# Patient Record
Sex: MALE | HISPANIC OR LATINO | Employment: PART TIME | URBAN - METROPOLITAN AREA
[De-identification: names, ages, dates, MRNs, and addresses within clinical notes are randomized per-mention and may not be internally consistent; named-entity substitution may affect disease eponyms.]

---
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
## 2024-02-20 ENCOUNTER — HOSPITAL ENCOUNTER (EMERGENCY)
Facility: CLINIC | Age: 19
Discharge: HOME | End: 2024-02-20
Attending: EMERGENCY MEDICINE | Admitting: EMERGENCY MEDICINE
Payer: COMMERCIAL

## 2024-02-20 VITALS
BODY MASS INDEX: 31.76 KG/M2 | DIASTOLIC BLOOD PRESSURE: 57 MMHG | RESPIRATION RATE: 16 BRPM | HEIGHT: 67 IN | HEART RATE: 108 BPM | WEIGHT: 202.38 LBS | TEMPERATURE: 98.2 F | OXYGEN SATURATION: 96 % | SYSTOLIC BLOOD PRESSURE: 132 MMHG

## 2024-02-20 DIAGNOSIS — J02.9 VIRAL PHARYNGITIS: ICD-10-CM

## 2024-02-20 LAB
ANION GAP SERPL CALCULATED.3IONS-SCNC: 8 MMOL/L (ref 7–15)
ATRIAL RATE - MUSE: 98 BPM
BASOPHILS # BLD AUTO: 0.1 10E3/UL (ref 0–0.2)
BASOPHILS NFR BLD AUTO: 1 %
BUN SERPL-MCNC: 13.8 MG/DL (ref 6–20)
CALCIUM SERPL-MCNC: 9.2 MG/DL (ref 8.6–10)
CHLORIDE SERPL-SCNC: 102 MMOL/L (ref 98–107)
CREAT SERPL-MCNC: 0.96 MG/DL (ref 0.67–1.17)
DEPRECATED HCO3 PLAS-SCNC: 26 MMOL/L (ref 22–29)
DIASTOLIC BLOOD PRESSURE - MUSE: NORMAL MMHG
EGFRCR SERPLBLD CKD-EPI 2021: >90 ML/MIN/1.73M2
EOSINOPHIL # BLD AUTO: 0 10E3/UL (ref 0–0.7)
EOSINOPHIL NFR BLD AUTO: 0 %
ERYTHROCYTE [DISTWIDTH] IN BLOOD BY AUTOMATED COUNT: 12.5 % (ref 10–15)
FLUAV RNA SPEC QL NAA+PROBE: NEGATIVE
FLUBV RNA RESP QL NAA+PROBE: NEGATIVE
GLUCOSE SERPL-MCNC: 106 MG/DL (ref 70–99)
GROUP A STREP BY PCR: NOT DETECTED
HCT VFR BLD AUTO: 44.9 % (ref 40–53)
HGB BLD-MCNC: 15.1 G/DL (ref 13.3–17.7)
HOLD SPECIMEN: NORMAL
HOLD SPECIMEN: NORMAL
IMM GRANULOCYTES # BLD: 0.1 10E3/UL
IMM GRANULOCYTES NFR BLD: 1 %
INTERPRETATION ECG - MUSE: NORMAL
LYMPHOCYTES # BLD AUTO: 1.7 10E3/UL (ref 0.8–5.3)
LYMPHOCYTES NFR BLD AUTO: 13 %
MCH RBC QN AUTO: 28.9 PG (ref 26.5–33)
MCHC RBC AUTO-ENTMCNC: 33.6 G/DL (ref 31.5–36.5)
MCV RBC AUTO: 86 FL (ref 78–100)
MONOCYTES # BLD AUTO: 1.3 10E3/UL (ref 0–1.3)
MONOCYTES NFR BLD AUTO: 10 %
NEUTROPHILS # BLD AUTO: 10.1 10E3/UL (ref 1.6–8.3)
NEUTROPHILS NFR BLD AUTO: 75 %
NRBC # BLD AUTO: 0 10E3/UL
NRBC BLD AUTO-RTO: 0 /100
P AXIS - MUSE: 64 DEGREES
PLATELET # BLD AUTO: 169 10E3/UL (ref 150–450)
POTASSIUM SERPL-SCNC: 3.8 MMOL/L (ref 3.4–5.3)
PR INTERVAL - MUSE: 150 MS
QRS DURATION - MUSE: 90 MS
QT - MUSE: 320 MS
QTC - MUSE: 408 MS
R AXIS - MUSE: 32 DEGREES
RBC # BLD AUTO: 5.22 10E6/UL (ref 4.4–5.9)
RSV RNA SPEC NAA+PROBE: NEGATIVE
SARS-COV-2 RNA RESP QL NAA+PROBE: NEGATIVE
SODIUM SERPL-SCNC: 136 MMOL/L (ref 135–145)
SYSTOLIC BLOOD PRESSURE - MUSE: NORMAL MMHG
T AXIS - MUSE: 48 DEGREES
TROPONIN T SERPL HS-MCNC: <6 NG/L
VENTRICULAR RATE- MUSE: 98 BPM
WBC # BLD AUTO: 13.2 10E3/UL (ref 4–11)

## 2024-02-20 PROCEDURE — 93005 ELECTROCARDIOGRAM TRACING: CPT

## 2024-02-20 PROCEDURE — 87651 STREP A DNA AMP PROBE: CPT | Performed by: EMERGENCY MEDICINE

## 2024-02-20 PROCEDURE — 87637 SARSCOV2&INF A&B&RSV AMP PRB: CPT | Performed by: EMERGENCY MEDICINE

## 2024-02-20 PROCEDURE — 80048 BASIC METABOLIC PNL TOTAL CA: CPT | Performed by: EMERGENCY MEDICINE

## 2024-02-20 PROCEDURE — 84484 ASSAY OF TROPONIN QUANT: CPT | Performed by: EMERGENCY MEDICINE

## 2024-02-20 PROCEDURE — 36415 COLL VENOUS BLD VENIPUNCTURE: CPT | Performed by: EMERGENCY MEDICINE

## 2024-02-20 PROCEDURE — 99284 EMERGENCY DEPT VISIT MOD MDM: CPT

## 2024-02-20 PROCEDURE — 85025 COMPLETE CBC W/AUTO DIFF WBC: CPT | Performed by: EMERGENCY MEDICINE

## 2024-02-20 RX ORDER — ALBUTEROL SULFATE 90 UG/1
2 AEROSOL, METERED RESPIRATORY (INHALATION) EVERY 6 HOURS PRN
Qty: 18 G | Refills: 0 | Status: SHIPPED | OUTPATIENT
Start: 2024-02-20

## 2024-02-20 ASSESSMENT — ACTIVITIES OF DAILY LIVING (ADL): ADLS_ACUITY_SCORE: 33

## 2024-02-20 NOTE — ED TRIAGE NOTES
Pt arrives with multiple complaints. Pt c/o midstenal chest pain, worse with deep breath, SOB, sore throat, body sweats, diarrhea and abdominal pain. Reports epistaxis this AM. Pt reports siblings are currently being treated for H.Pylori. Pt has HX asthma. Has not used inhaler prior to arrival. A&OX4.      Triage Assessment (Adult)       Row Name 02/20/24 0714          Triage Assessment    Airway WDL WDL        Respiratory WDL    Respiratory WDL X;rhythm/pattern     Rhythm/Pattern, Respiratory shortness of breath        Skin Circulation/Temperature WDL    Skin Circulation/Temperature WDL WDL        Cardiac WDL    Cardiac WDL X;chest pain        Chest Pain Assessment    Chest Pain Location midsternal        Peripheral/Neurovascular WDL    Peripheral Neurovascular WDL WDL        Cognitive/Neuro/Behavioral WDL    Cognitive/Neuro/Behavioral WDL WDL

## 2024-02-20 NOTE — ED PROVIDER NOTES
"  History     Chief Complaint:  Sore Throat     The history is provided by the patient.      Zack Mercado is a 18 year old male who presents to the ED with sore throat. Patient's symptoms started yesterday. Endorses cold sweats, sore throat, chest pain, rigors, headache, congestion, shortness of breath, right ear pain, diarrhea,  fatigue, weakness, and some abdominal discomfort. Denies coughing, rash, nausea, vomiting, and dysuria. Notes that his asthma is managed well. He took some Tylenol yesterday. He is concerned that both of his brothers have H.pylori. He denies any other symptoms.     Independent Historian:   None - Patient Only    Review of External Notes:  None    Medications:    Tylenol     Past Medical History:    Asthma  Scapular dyskinesis   High triglycerides   Food insecurity     Past Surgical History:    Appendectomy      Physical Exam   Patient Vitals for the past 24 hrs:   BP Temp Temp src Pulse Resp SpO2 Height Weight   02/20/24 0920 132/57 -- -- 108 16 96 % -- --   02/20/24 0714 125/81 98.2  F (36.8  C) Temporal 118 20 98 % 1.702 m (5' 7\") 91.8 kg (202 lb 6.1 oz)        Physical Exam  General: Well appearing, nontoxic.  Resting comfortably  Head:  Scalp, face, and head appear normal  Eyes:  Pupils are equal, round    Conjunctivae non-injected and sclerae white  ENT:    The external nose is normal    Pinnae are normal. Bilateral TMs clear without erythema, bulging, or effusion. Auditory canals normal.  Patient is moderately erythematous without exudates swelling lesions.  Mucous membranes moist.  Neck:  Normal range of motion    There is no rigidity noted    Trachea is in the midline  CV:  Regular rate and rhythm     Normal S1/S2, no S3/S4    No murmur or rub. Radial pulses 2+ bilaterally.  Resp:  Lungs are clear and equal bilaterally  There is no tachypnea    No increased work of breathing    No rales, wheezing, or rhonchi  GI:  Abdomen is soft, no rigidity or guarding    No " distension, or mass    No tenderness or rebound tenderness   MS:  Normal muscular tone    Symmetric motor strength    No lower extremity edema  Skin:  No rash or acute skin lesions noted  Neuro:  Awake and alert  Speech is normal and fluent  Moves all extremities spontaneously  Psych: Normal affect. Appropriate interactions.     Emergency Department Course   ECG  ECG taken at 0726, ECG read at 0759  Normal sinus rhythm    Rate 98 bpm. HI interval 150 ms. QRS duration 90 ms. QT/QTc 320/408 ms. P-R-T axes 64 32 48.     Imaging:  None    Laboratory:  Labs Ordered and Resulted from Time of ED Arrival to Time of ED Departure   BASIC METABOLIC PANEL - Abnormal       Result Value    Sodium 136      Potassium 3.8      Chloride 102      Carbon Dioxide (CO2) 26      Anion Gap 8      Urea Nitrogen 13.8      Creatinine 0.96      GFR Estimate >90      Calcium 9.2      Glucose 106 (*)    CBC WITH PLATELETS AND DIFFERENTIAL - Abnormal    WBC Count 13.2 (*)     RBC Count 5.22      Hemoglobin 15.1      Hematocrit 44.9      MCV 86      MCH 28.9      MCHC 33.6      RDW 12.5      Platelet Count 169      % Neutrophils 75      % Lymphocytes 13      % Monocytes 10      % Eosinophils 0      % Basophils 1      % Immature Granulocytes 1      NRBCs per 100 WBC 0      Absolute Neutrophils 10.1 (*)     Absolute Lymphocytes 1.7      Absolute Monocytes 1.3      Absolute Eosinophils 0.0      Absolute Basophils 0.1      Absolute Immature Granulocytes 0.1      Absolute NRBCs 0.0     TROPONIN T, HIGH SENSITIVITY - Normal    Troponin T, High Sensitivity <6     INFLUENZA A/B, RSV, & SARS-COV2 PCR - Normal    Influenza A PCR Negative      Influenza B PCR Negative      RSV PCR Negative      SARS CoV2 PCR Negative     GROUP A STREPTOCOCCUS PCR THROAT SWAB - Normal    Group A strep by PCR Not Detected       Procedures   None    Emergency Department Course & Assessments:    Interventions:  Medications - No data to display     Assessments, Independent  Interpretation, Consults/Discussion of Management/Tests:  ED Course as of 02/20/24 1259   Tue Feb 20, 2024   0830 I obtained the history and examined the patient as noted above.       Social Determinants of Health affecting care:  None    Disposition:  The patient was discharged to home.     Impression & Plan      Medical Decision Making:  Zack Mercado is a 18 year old male who presents to the ED for evaluation of symptoms consistent with viral syndrome.  On my evaluation he is well-appearing, hemodynamically stable and afebrile.  Abdominal exam is benign without tenderness whatsoever.  No evidence of otitis media or otitis externa.  Lungs are clear and equal to auscultation.  No clinical findings to suggest pneumonia. COVID/Influenza/RSV testing negative. Strep PCR testing is negative.  Findings are most consistent with viral pharyngitis.  Laboratory studies and EKG are unremarkable.  Signs and symptoms are not consistent with primary cardiac etiology.  No evidence of pericarditis.  I recommended supportive care at home with Tylenol, ibuprofen, continued use of his albuterol Hailer as needed for asthma.  No significant wheezing or bronchospasm on exam.  No significant asthma exacerbation.  I recommended close follow-up with PCP if symptoms do not improve.  Patient agreeable with the plan of care and he was discharged in stable condition.    Diagnosis:    ICD-10-CM    1. Viral pharyngitis  J02.9            Discharge Medications:  Discharge Medication List as of 2/20/2024  9:17 AM        START taking these medications    Details   acetaminophen 500 MG CAPS Take 2 capsules by mouth every 8 hours as needed (For aches, pain, fever) Do not take more than 4000mg in 24 hours., Disp-60 capsule, R-0, E-Prescribe      albuterol (PROAIR HFA/PROVENTIL HFA/VENTOLIN HFA) 108 (90 Base) MCG/ACT inhaler Inhale 2 puffs into the lungs every 6 hours as needed for shortness of breath, wheezing or cough, Disp-18 g, R-0,  E-PrescribePharmacy may dispense brand covered by insurance (Proair, or proventil or ventolin or generic albuterol inhaler)            Scribe Disclosure:  I, French Young, am serving as a scribe at 9:09 AM on 2/20/2024 to document services personally performed by Russel Nascimento MD based on my observations and the provider's statements to me.    2/20/2024   Russel Nascimento MD Daro, Ryan Clay, MD  02/20/24 1308

## 2024-06-10 ENCOUNTER — HOSPITAL ENCOUNTER (EMERGENCY)
Facility: CLINIC | Age: 19
Discharge: HOME | End: 2024-06-10
Attending: EMERGENCY MEDICINE | Admitting: EMERGENCY MEDICINE
Payer: COMMERCIAL

## 2024-06-10 VITALS
RESPIRATION RATE: 18 BRPM | SYSTOLIC BLOOD PRESSURE: 137 MMHG | OXYGEN SATURATION: 98 % | HEIGHT: 67 IN | DIASTOLIC BLOOD PRESSURE: 89 MMHG | BODY MASS INDEX: 33.01 KG/M2 | HEART RATE: 96 BPM | TEMPERATURE: 98.9 F | WEIGHT: 210.32 LBS

## 2024-06-10 DIAGNOSIS — K62.5 BRBPR (BRIGHT RED BLOOD PER RECTUM): ICD-10-CM

## 2024-06-10 LAB
ABO/RH(D): NORMAL
ALBUMIN SERPL BCG-MCNC: 4.7 G/DL (ref 3.5–5.2)
ALP SERPL-CCNC: 102 U/L (ref 65–260)
ALT SERPL W P-5'-P-CCNC: 39 U/L (ref 0–50)
ANION GAP SERPL CALCULATED.3IONS-SCNC: 13 MMOL/L (ref 7–15)
ANTIBODY SCREEN: NEGATIVE
AST SERPL W P-5'-P-CCNC: 28 U/L (ref 0–35)
BASOPHILS # BLD AUTO: 0.1 10E3/UL (ref 0–0.2)
BASOPHILS NFR BLD AUTO: 1 %
BILIRUB SERPL-MCNC: 0.2 MG/DL
BUN SERPL-MCNC: 14.5 MG/DL (ref 6–20)
CALCIUM SERPL-MCNC: 9.9 MG/DL (ref 8.6–10)
CHLORIDE SERPL-SCNC: 101 MMOL/L (ref 98–107)
CREAT SERPL-MCNC: 0.89 MG/DL (ref 0.67–1.17)
DEPRECATED HCO3 PLAS-SCNC: 26 MMOL/L (ref 22–29)
EGFRCR SERPLBLD CKD-EPI 2021: >90 ML/MIN/1.73M2
EOSINOPHIL # BLD AUTO: 0.1 10E3/UL (ref 0–0.7)
EOSINOPHIL NFR BLD AUTO: 1 %
ERYTHROCYTE [DISTWIDTH] IN BLOOD BY AUTOMATED COUNT: 12.6 % (ref 10–15)
GLUCOSE SERPL-MCNC: 99 MG/DL (ref 70–99)
HCT VFR BLD AUTO: 46.5 % (ref 40–53)
HGB BLD-MCNC: 15 G/DL (ref 13.3–17.7)
HOLD SPECIMEN: NORMAL
HOLD SPECIMEN: NORMAL
IMM GRANULOCYTES # BLD: 0 10E3/UL
IMM GRANULOCYTES NFR BLD: 0 %
LYMPHOCYTES # BLD AUTO: 2.2 10E3/UL (ref 0.8–5.3)
LYMPHOCYTES NFR BLD AUTO: 32 %
MCH RBC QN AUTO: 28.5 PG (ref 26.5–33)
MCHC RBC AUTO-ENTMCNC: 32.3 G/DL (ref 31.5–36.5)
MCV RBC AUTO: 88 FL (ref 78–100)
MONOCYTES # BLD AUTO: 0.7 10E3/UL (ref 0–1.3)
MONOCYTES NFR BLD AUTO: 10 %
NEUTROPHILS # BLD AUTO: 3.8 10E3/UL (ref 1.6–8.3)
NEUTROPHILS NFR BLD AUTO: 56 %
NRBC # BLD AUTO: 0 10E3/UL
NRBC BLD AUTO-RTO: 0 /100
PLATELET # BLD AUTO: 205 10E3/UL (ref 150–450)
POTASSIUM SERPL-SCNC: 4.3 MMOL/L (ref 3.4–5.3)
PROT SERPL-MCNC: 7.9 G/DL (ref 6.4–8.3)
RBC # BLD AUTO: 5.26 10E6/UL (ref 4.4–5.9)
SODIUM SERPL-SCNC: 140 MMOL/L (ref 135–145)
SPECIMEN EXPIRATION DATE: NORMAL
WBC # BLD AUTO: 6.8 10E3/UL (ref 4–11)

## 2024-06-10 PROCEDURE — 86900 BLOOD TYPING SEROLOGIC ABO: CPT | Performed by: EMERGENCY MEDICINE

## 2024-06-10 PROCEDURE — 36415 COLL VENOUS BLD VENIPUNCTURE: CPT | Performed by: EMERGENCY MEDICINE

## 2024-06-10 PROCEDURE — 85025 COMPLETE CBC W/AUTO DIFF WBC: CPT | Performed by: EMERGENCY MEDICINE

## 2024-06-10 PROCEDURE — 80053 COMPREHEN METABOLIC PANEL: CPT | Performed by: EMERGENCY MEDICINE

## 2024-06-10 PROCEDURE — 99283 EMERGENCY DEPT VISIT LOW MDM: CPT

## 2024-06-10 ASSESSMENT — COLUMBIA-SUICIDE SEVERITY RATING SCALE - C-SSRS
6. HAVE YOU EVER DONE ANYTHING, STARTED TO DO ANYTHING, OR PREPARED TO DO ANYTHING TO END YOUR LIFE?: NO
1. IN THE PAST MONTH, HAVE YOU WISHED YOU WERE DEAD OR WISHED YOU COULD GO TO SLEEP AND NOT WAKE UP?: NO
2. HAVE YOU ACTUALLY HAD ANY THOUGHTS OF KILLING YOURSELF IN THE PAST MONTH?: NO

## 2024-06-10 ASSESSMENT — ACTIVITIES OF DAILY LIVING (ADL)
ADLS_ACUITY_SCORE: 33
ADLS_ACUITY_SCORE: 33

## 2024-06-10 NOTE — ED TRIAGE NOTES
Patient states he has been having blood in his stool since yesterday  patient reports the blood is bright red and he denies pain.      Triage Assessment (Adult)       Row Name 06/10/24 6621          Triage Assessment    Airway WDL WDL        Respiratory WDL    Respiratory WDL WDL        Skin Circulation/Temperature WDL    Skin Circulation/Temperature WDL WDL        Cardiac WDL    Cardiac WDL WDL        Peripheral/Neurovascular WDL    Peripheral Neurovascular WDL WDL        Cognitive/Neuro/Behavioral WDL    Cognitive/Neuro/Behavioral WDL WDL

## 2024-06-11 NOTE — DISCHARGE INSTRUCTIONS
It was a pleasure taking care of you today. I hope you feel much better soon.  Your workup was reassuring against serious anemia (blood loss). A colonoscopy has been ordered for further evaluation.  Please follow-up with your primary care doctor in 3-5 days.  Return immediately for pain, fever, worse bleeding, or any other concerns.

## 2024-06-11 NOTE — ED PROVIDER NOTES
"  Emergency Department Note      History of Present Illness     Chief Complaint  Rectal Bleeding    HPI  Zack Mercado is a 18 year old male who presents to the ED for rectal bleeding after noticing blood after a bowel movement yesterday. He reports a substantial amount of blood when wiping and in the toilet bowl. He denies any pain or abdominal pain. He denies feeling anything when wiping. Reports some diarrhea since 2 days ago. Reports history of similar symptoms one year ago and was given stool softeners and laxatives. He notes having diarrhea usually once or twice a week. Denies a family history of ulcerative colitis or Crohn's disease.     Independent Historian  None    Review of External Notes  Reviewed 3/11/2024 office visit  Past Medical History   Medical History and Problem List  Asthma    Medications  The patient is currently on no regular medications.    Surgical History   Appendectomy    Physical Exam   Patient Vitals for the past 24 hrs:   BP Temp Temp src Pulse Resp SpO2 Height Weight   06/10/24 1844 137/89 98.9  F (37.2  C) Oral 96 18 98 % 1.702 m (5' 7\") 95.4 kg (210 lb 5.1 oz)     Physical Exam  Constitutional: Alert, attentive  HENT:    Nose: Nose normal.   Eyes: EOM are normal.   CV: regular rate and rhythm; no murmurs, rubs or gallups  Chest: Effort normal and breath sounds normal.   GI:  There is no tenderness. No distension. Normal bowel sounds  MSK: Normal range of motion.   Neurological: Alert, attentive  Skin: Skin is warm and dry.    Diagnostics   Lab Results   Labs Ordered and Resulted from Time of ED Arrival to Time of ED Departure   COMPREHENSIVE METABOLIC PANEL - Normal       Result Value    Sodium 140      Potassium 4.3      Carbon Dioxide (CO2) 26      Anion Gap 13      Urea Nitrogen 14.5      Creatinine 0.89      GFR Estimate >90      Calcium 9.9      Chloride 101      Glucose 99      Alkaline Phosphatase 102      AST 28      ALT 39      Protein Total 7.9      Albumin 4.7 "      Bilirubin Total 0.2     CBC WITH PLATELETS AND DIFFERENTIAL    WBC Count 6.8      RBC Count 5.26      Hemoglobin 15.0      Hematocrit 46.5      MCV 88      MCH 28.5      MCHC 32.3      RDW 12.6      Platelet Count 205      % Neutrophils 56      % Lymphocytes 32      % Monocytes 10      % Eosinophils 1      % Basophils 1      % Immature Granulocytes 0      NRBCs per 100 WBC 0      Absolute Neutrophils 3.8      Absolute Lymphocytes 2.2      Absolute Monocytes 0.7      Absolute Eosinophils 0.1      Absolute Basophils 0.1      Absolute Immature Granulocytes 0.0      Absolute NRBCs 0.0     TYPE AND SCREEN, ADULT    ABO/RH(D) O POS      Antibody Screen Negative      SPECIMEN EXPIRATION DATE [id number]     ABO/RH TYPE AND SCREEN     Independent Interpretation  None  ED Course      ED Course  ED Course as of 06/11/24 0134   Mon Bill 10, 2024   2219 I obtained history and examined the patient as noted above. I explained findings to the patient and we discussed plan for discharge. The patient is comfortable with this plan.      Medical Decision Making / Diagnosis   CMS Diagnoses: None    MIPS     None    [facility]  Zack Mercado is a 18 year old male who presents for evaluation of painless BRBPR.  Patient denies any signs or symptoms of external hemorrhoids, and absent pain or other features, sense of exam is not indicated at this time.  He is hemodynamically stable and has normal hemoglobin.  No evidence of life-threatening bleeding at this time.  Suspect possible internal hemorrhoid versus other benign cause of symptoms.  However, given intermittent diarrhea, UC/Crohn's are also considered.  Plan primary care follow-up in 3 to 5 days for recheck and referral for colonoscopy.  Return precautions for fever, pain, or any other concerns.    Disposition  The patient was discharged.     ICD-10 Codes:    ICD-10-CM    1. BRBPR (bright red blood per rectum)  K62.5 Adult GI  Referral - Procedure Only              Scribe Disclosure:  I, Viktoria Guru, am serving as a scribe at 10:26 PM on 6/10/2024 to document services personally performed by Shahzad Orr MD based on my observations and the provider's statements to me.        Shahzad Orr MD  06/11/24 0135

## 2024-06-18 ENCOUNTER — HOSPITAL ENCOUNTER (OUTPATIENT)
Facility: CLINIC | Age: 19
End: 2024-06-18
Attending: COLON & RECTAL SURGERY | Admitting: COLON & RECTAL SURGERY
Payer: COMMERCIAL

## 2024-06-18 ENCOUNTER — TELEPHONE (OUTPATIENT)
Dept: GASTROENTEROLOGY | Facility: CLINIC | Age: 19
End: 2024-06-18
Payer: COMMERCIAL

## 2024-06-18 NOTE — TELEPHONE ENCOUNTER
"Endoscopy Scheduling Screen    Have you had a positive Covid test in the last 14 days?  No    What is your communication preference for Instructions and/or Bowel Prep?   Mail/USPS    What insurance is in the chart?  Other:  [facility]    Ordering/Referring Provider:   JENNIFER WILLAMS        (If ordering provider performs procedure, schedule with ordering provider unless otherwise instructed. )    BMI: Estimated body mass index is 32.94 kg/m  as calculated from the following:    Height as of 6/10/24: 1.702 m (5' 7\").    Weight as of 6/10/24: 95.4 kg (210 lb 5.1 oz).     Sedation Ordered  moderate sedation.   If patient BMI > 50 do not schedule in [facility].    If patient BMI > 45 do not schedule at [facility].    Are you taking methadone or Suboxone?  No    Have you had difficulties, pain, or discomfort during past endoscopy procedures?  No    Are you taking any prescription medications for pain 3 or more times per week?   NO, No RN review required.    Do you have a history of malignant hyperthermia?  No    (Females) Are you currently pregnant?   No     Have you been diagnosed or told you have pulmonary hypertension?   No    Do you have an LVAD?  No    Have you been told you have moderate to severe sleep apnea?  No    Have you been told you have COPD, asthma, or any other lung disease?  Yes     What breathing problems do you have?  Asthma     Do you use home oxygen?  No    Have your breathing problems required an ED visit or hospitalization in the last year?  No    Do you have any heart conditions?  No     Have you ever had or are you waiting for an organ transplant?  No. Continue scheduling, no site restrictions.    Have you had a stroke or transient ischemic attack (TIA aka \"mini stroke\" in the last 6 months?   No    Have you been diagnosed with or been told you have cirrhosis of the liver?   No    Are you currently on dialysis?   No    Do you need assistance transferring?   No    BMI: Estimated body mass index is 32.94 kg/m  " "as calculated from the following:    Height as of 6/10/24: 1.702 m (5' 7\").    Weight as of 6/10/24: 95.4 kg (210 lb 5.1 oz).     Is patients BMI > 40 and scheduling location [facility]?  No    Do you take an injectable medication for weight loss or diabetes (excluding insulin)?  No    Do you take the medication Naltrexone?  No    Do you take blood thinners?  No       Prep   Are you currently on dialysis or do you have chronic kidney disease?  No    Do you have a diagnosis of diabetes?  No    Do you have a diagnosis of cystic fibrosis (CF)?  No    On a regular basis do you go 3 -5 days between bowel movements?  No    BMI > 40?  No    Preferred Pharmacy:        [id number] - [place], [place] - [address]  [address]  [facility] [address]  Phone: [phone] Fax: [phone]      Final Scheduling Details     Procedure scheduled  Colonoscopy    Surgeon:  Sonya     Date of procedure:  7/19     Pre-OP / PAC:   No - Not required for this site.    Location  [facility] - Patient preference.    Sedation   Moderate Sedation - Per order.      Patient Reminders:   You will receive a call from a Nurse to review instructions and health history.  This assessment must be completed prior to your procedure.  Failure to complete the Nurse assessment may result in the procedure being cancelled.      On the day of your procedure, please designate an adult(s) who can drive you home stay with you for the next 24 hours. The medicines used in the exam will make you sleepy. You will not be able to drive.      You cannot take public transportation, ride share services, or non-medical taxi service without a responsible caregiver.  Medical transport services are allowed with the requirement that a responsible caregiver will receive you at your destination.  We require that drivers and caregivers are confirmed prior to your procedure.  "

## 2024-06-18 NOTE — LETTER
6/18/2024      Zack Mercado  [address]  [facility] [address]      Standard Miralax Bowel Prep   Prep instructions for your colonoscopy   For prep questions, please call: [facility] - [address] [facility], [place] [address] - [phone] option 2    Please read these instructions carefully at least 7 days prior to your colonoscopy procedure. Be sure to follow all directions completely. The inside of your colon must be clean to allow for a complete examination for the presence of any growths, polyps, and/or abnormalities, as well as their biopsy or removal. A number of tips are included in order to make this part of the procedure as comfortable as possible.    Getting ready   Purchase the following items over-the-counter/off the shelf at the drug store:    Four (4) - Dulcolax laxative (Bisacodyl) 5mg tablets (Do not use Dulcolax stool softener)   8.3 ounce bottle of Miralax powder (ClearLAX, SmoothLAX, PowderLAX)  64 ounces of Gatorade or similar sports drink. Not red or purple. (Pedialyte, Propel, Gatorade G2/Zero, Powerade, Powerade Zero)   10 ounce bottle of clear Magnesium Citrate    A nurse will call you to go over your appointment details and prep instructions. Not completing the nurse call could result with your appointment being cancelled.    You must arrange for an adult to drive you home after your exam. Your colonoscopy cannot be done unless you have a ride. If you need to use public transportation, someone must ride with you and stay with you for up to 24 hours.       7 days before procedure     Consult with your prescribing provider about stopping any:     Diabetic/Weight Loss Injectable Medication GLP-1 agonist (such as Exenatide (Byetta, Bydureon),  Mounjaro (Tirzepatide). Ozempic (Semaglutide). Semaglutide. Symlin (Pamlintide), Tanzeum (Albiglutide). Tirzepatide-Weight Management (Zepbound), Trulicity (Dulaglutide), Victoza (Saxenda, Liraglutide), Wegovy (Semaglutide)  please follow below guidelines for holding:    For weekly injection HOLD 7 days before procedure.  For once or twice a day injection HOLD the day before procedure and day of procedure.  For oral, daily dosing (Rybelsus) HOLD 7 days before procedure.    Blood thinning and/or anti platelet medications: such as Coumadin, Plavix, Xarelto, Eliquis, Lovenox or others, these medications may need to be stopped temporarily before your procedure.     If you take insulin for diabetes, ask your prescribing provider for instructions on how to take this medication while preparing for a colonoscopy.      Stop taking iron (ferrous sulfate), multivitamins that contain iron, and/or fiber supplements (Metamucil, Benefiber, Psyllium husk powder, Fibercon, etc.).      Stop eating whole kernel corn, popcorn, nuts, and foods that contain seeds. These can stay in the colon for many days, and they can clog up the colonoscope.       3 days before procedure     Begin a low-fiber diet (see examples below). No Olestra (a fat substitute).    Consume no more than 10-15 grams of fiber each day.     It is important to stay hydrated. Drink at least eight 8-ounce glasses of water a day.      LOW FIBER DIET   You can have:   Do not have:    Starches: White bread, rolls, biscuits, croissants, Beverly toast, white flour tortillas, waffles, pancakes, Hebrew toast; white rice, noodles, pasta, macaroni; cooked and peeled potatoes; plain crackers, saltines; cooked farina or cream of rice; puffed rice, corn flakes, Rice Krispies, Special K      Vegetables: tender cooked and canned, vegetable broths     Fruits and fruit juices: Strained fruit juice, canned fruit without seeds or skin (not pineapple), applesauce, pear sauce, ripe bananas, melons (not watermelon)     Milk products: Milk (plain or flavored), cheese, cottage cheese, yogurt (no berries), custard, ice cream       Proteins: Tender, well-cooked ground beef, lamb, veal, ham, pork, chicken, turkey, fish  or organ meat, Tofu, eggs, creamy peanut butter      Fats and condiments:  Margarine, butter, oils, mayonnaise, sour cream, salad dressing, plain gravy; spices, cooked herbs; sugar, clear jelly, honey, syrup      Snacks, sweets and drinks: Pretzels, hard candy; plain cakes and cookies (no nuts or seeds); gelatin, plain pudding, sherbet, Popsicles; coffee, tea, carbonated ( fizzy ) drinks  Starches: Breads or rolls that contain nuts, seeds or fruit; whole wheat or whole grain breads that contain more than 2 grams of fiber per serving; cornbread; corn or whole wheat tortillas; potatoes with skin; brown rice, wild rice, quinoa, kasha (buckwheat), and oatmeal      Vegetables: Any raw or steamed vegetables; vegetables with seeds; corn in any form      Fruits and fruit juices: Prunes, prune juice, raisins and other dried fruits, berries and other fruits with seeds, canned pineapple juices with pulp such as orange, grapefruit, pineapple or tomato juice     Milk products: Any yogurt with nuts, seeds or berries      Proteins: Tough, fibrous meats with gristle; cooked dried beans, peas or lentils; crunchy peanut butter     Fats and condiments: Pickles, olives, relish, horseradish; jam, marmalade, preserves      Snacks, sweets and drinks: Popcorn, nuts, seeds, granola, coconut, candies made with nuts or seeds; all desserts that contain nuts, seeds, raisins and other dried fruits, coconut, whole grains or bran.       1 day before procedure       Start a clear liquid diet (see examples below). Do not eat any solid food.      Drink at least eight to ten 8-ounce glasses of water throughout the day. ? ? ? ? ? ? ? ?      CLEAR LIQUID DIET:  You can have: Do not have:    Water, tea, coffee (no milk or cream)   Soda pop, Gatorade (not red or purple)   Coconut water   Jell-O, Popsicles (no milk or fruit pieces - not red or purple)   Fat-free soup broth or bouillon   Plain hard candy, such as clear life savers (not red or purple)    Clear juices and fruit-flavored drinks, such as apple juice, white grape juice, Hi-C, and Nile-Aid (not red or purple)  Milk or milk products such as ice cream, malts or shakes, or coffee creamer   Red or purple drinks of any kind such as cranberry juice, grape juice or Nile-Aid. Avoid red or purple Jell-O, Popsicles, sorbet, sherbet and candy   Juices with pulp such as orange, grapefruit, pineapple or tomato juice   Cream soups of any kind   Alcohol and beer   Protein drinks or protein powder     Step 1     At 4 PM, take 2 Dulcolax (Bisacodyl) tablets.   At 5 PM, mix the entire bottle of Miralax with 64 ounces of Gatorade in a pitcher and stir to dissolve the powder. Start drinking one 8-ounce glass of the Miralax and Gatorade mixture every 15 minutes until the pitcher is HALF empty (about 4 glasses).  Drink each glass quickly. Store the rest in the refrigerator.   Continue to drink clear liquids.    Step 2     At 10 PM, take 2 Dulcolax (Bisacodyl) tablets  At 10 PM start drinking the remainder of the Miralax and Gatorade mixture. Drink one 8-ounce glass of Miralax and Gatorade mixture every 15 minutes until the pitcher is empty (about 4 glasses). Drink each glass quickly.     Step 3     If you arrive for your procedure BEFORE 11 AM:  6 hours prior to your scheduled arrival to the endoscopy unit, drink 10 ounces of clear Magnesium Citrate.    If you arrive for your procedure AFTER 11 AM:  At 6 AM on the day of the exam drink 10 ounces of clear Magnesium Citrate.       Reminders While Drinking Laxatives:     After you start drinking the solution, stay near a toilet. You may have watery stools (diarrhea), mild cramping, bloating, and nausea. You may want to use Vaseline on the skin around your anus after each bowel movement or use wet wipes to prevent irritation. Bowel movements will be liquid and dark in color at first and then should turn clear yellow in color.      Some find it easier to drink the Miralax and  Gatorade mixture when it is chilled. Do not add ice as this will dilute the laxative. Drinking from a straw can be helpful to drink the liquid faster.     If you have nausea or vomiting during drinking the solution, rinse your mouth with water and take a 15-30 minute break and then continue drinking solution.       Day of procedure     2 hours before your arrival time stop drinking all liquids, including water.   Do not smoke or swallow anything, including water or gum for at least 2 hours before your arrival time. This is a safety issue. Your procedure could be cancelled if you do not follow directions.  No chewing tobacco 6 hours prior to procedure arrival time.     You may take your necessary morning medications with sips of water (4 ounces).   Do not take diabetes medicine by mouth until after your exam.  If you have asthma, bring your inhalers.  Please perform your nebulizer treatments and airway clearance therapy in the morning prior to the procedure (if applicable).    Arrive with a responsible adult who can drive you home and stay with you for up to 24 hours. The medications used during the procedure will make you sleepy, so you won't be able to drive yourself home.   You cannot use public transportation, ride-share services, or non-medical taxi services without a responsible caregiver. Medical transport services are okay, but a caregiver must be there to receive you at your destination.  Please check in with your  when you arrive. Drivers should stay on campus.    Expect to be at the procedure center for about 1.5-2.5 hours.    Do not wear jewelry (i.e. earrings, rings, necklaces, watches, etc.). Leave your purse, billfold, credit cards, and other valuables at home.      Bring insurance card and ID.       Answers to Commonly Asked Questions     How soon can I eat after the procedure?  You may resume your normal diet when you feel ready, unless advised otherwise by the doctor performing your procedure.  We recommend starting with a light meal.   Do not drink alcohol for 24 hours after your procedure.  You may resume normal activities (work, exercise, etc.) after 24 hours.    How might I feel after the procedure?  It is normal to feel bloated and gassy after your procedure. Walking will help move the air through your colon. You can take non-aspirin pain relievers that contain acetaminophen (Tylenol).  If you are having sedation, we require a responsible adult to take you home for your safety. The sedation medicines used to relax you during the procedure can impair your judgement and reaction time, and make you forgetful and possibly a little unsteady.  Do not drive, make any important decisions, or sign any legal documents for 24 hours after your procedure.    When will I get my test results?  You should have your procedure results and any lab results (if applicable) by letter, optionsXpresst message, or phone call within 2 weeks. If you have any questions, please call the doctor that referred you for the procedure.    How do I know if my colon is cleaned out?   After completing the bowel prep, your bowel movements should be all liquid and yellow. Your bowel movements will look similar to urine in the toilet. If there are pieces of stool (poop) in the toilet, or if you can't see to the bottom of the toilet, please call our office for advice. Call [phone] and ask to speak with a nurse.    Why is the Miralax bowel prep taken in several steps?   The stool is flushed out by a large wave of fluid going through the colon. Just sipping a large volume of the solution will not achieve the desired result. Studies have shown that two smaller waves (or more in some cases) are better than one large one.      Why do I need to drink the magnesium citrate so close to the procedure arrival time?   The intestine continues to produce mucus and waste. Longer intervals between the prep and the exam can lead to less than desired results.  However, the stomach must be empty at the time of the exam in order to allow safe sedation. Therefore, there should be nothing by mouth 2 hours before the exam is started.    What if I need to cancel or reschedule my procedure?  Contact our endoscopy scheduling team at [phone], option 2. Monday through Friday, 7:00am-5:00pm.

## 2024-06-18 NOTE — LETTER
June 21, 2024      Zack Mercado  [address]  [facility] [address]              Standard Miralax Bowel Prep   Prep instructions for your colonoscopy   For prep questions, please call: [facility]; [phone] option 4    Please read these instructions carefully at least 7 days prior to your colonoscopy procedure. Be sure to follow all directions completely. The inside of your colon must be clean to allow for a complete examination for the presence of any growths, polyps, and/or abnormalities, as well as their biopsy or removal. A number of tips are included in order to make this part of the procedure as comfortable as possible.    Getting ready   Purchase the following items over-the-counter/off the shelf at the drug store:    Four (4) - Dulcolax laxative (Bisacodyl) 5mg tablets (Do not use Dulcolax stool softener)   8.3 ounce bottle of Miralax powder (ClearLAX, SmoothLAX, PowderLAX)  64 ounces of Gatorade or similar sports drink. Not red or purple. (Pedialyte, Propel, Gatorade G2/Zero, Powerade, Powerade Zero)   10 ounce bottle of clear Magnesium Citrate    A nurse will call you to go over your appointment details and prep instructions. Not completing the nurse call could result with your appointment being cancelled.    You must arrange for an adult to drive you home after your exam. Your colonoscopy cannot be done unless you have a ride. If you need to use public transportation, someone must ride with you and stay with you for up to 24 hours.       7 days before procedure     Consult with your prescribing provider about stopping any:     Diabetic/Weight Loss Injectable Medication GLP-1 agonist (such as Exenatide (Byetta, Bydureon),  Mounjaro (Tirzepatide). Ozempic (Semaglutide). Semaglutide. Symlin (Pamlintide), Tanzeum (Albiglutide). Tirzepatide-Weight Management (Zepbound), Trulicity (Dulaglutide), Victoza (Saxenda, Liraglutide), Wegovy (Semaglutide) please follow below guidelines for  holding:    For weekly injection HOLD 7 days before procedure.  For once or twice a day injection HOLD the day before procedure and day of procedure.  For oral, daily dosing (Rybelsus) HOLD 7 days before procedure.    Blood thinning and/or anti platelet medications: such as Coumadin, Plavix, Xarelto, Eliquis, Lovenox or others, these medications may need to be stopped temporarily before your procedure.     If you take insulin for diabetes, ask your prescribing provider for instructions on how to take this medication while preparing for a colonoscopy.     NSAIDs medications such as Sulindac, Celebrex, Mobic, Relafen or others may need to be stopped before the procedure. This will be discussed during nurse review call, or you can reach out to your prescribing provider.      Stop taking iron (ferrous sulfate), multivitamins that contain iron, and/or fiber supplements (Metamucil, Benefiber, Psyllium husk powder, Fibercon, etc.).      Stop eating whole kernel corn, popcorn, nuts, and foods that contain seeds. These can stay in the colon for many days, and they can clog up the colonoscope.       3 days before procedure     Begin a low-fiber diet (see examples below). No Olestra (a fat substitute).    Consume no more than 10-15 grams of fiber each day.     It is important to stay hydrated. Drink at least eight 8-ounce glasses of water a day.      LOW FIBER DIET   You can have:   Do not have:    Starches: White bread, rolls, biscuits, croissants, Kassidy toast, white flour tortillas, waffles, pancakes, Telugu toast; white rice, noodles, pasta, macaroni; cooked and peeled potatoes; plain crackers, saltines; cooked farina or cream of rice; puffed rice, corn flakes, Rice Krispies, Special K      Vegetables: tender cooked and canned, vegetable broths     Fruits and fruit juices: Strained fruit juice, canned fruit without seeds or skin (not pineapple), applesauce, pear sauce, ripe bananas, melons (not watermelon)     Milk  products: Milk (plain or flavored), cheese, cottage cheese, yogurt (no berries), custard, ice cream       Proteins: Tender, well-cooked ground beef, lamb, veal, ham, pork, chicken, turkey, fish or organ meat, Tofu, eggs, creamy peanut butter      Fats and condiments:  Margarine, butter, oils, mayonnaise, sour cream, salad dressing, plain gravy; spices, cooked herbs; sugar, clear jelly, honey, syrup      Snacks, sweets and drinks: Pretzels, hard candy; plain cakes and cookies (no nuts or seeds); gelatin, plain pudding, sherbet, Popsicles; coffee, tea, carbonated ( fizzy ) drinks  Starches: Breads or rolls that contain nuts, seeds or fruit; whole wheat or whole grain breads that contain more than 2 grams of fiber per serving; cornbread; corn or whole wheat tortillas; potatoes with skin; brown rice, wild rice, quinoa, kasha (buckwheat), and oatmeal      Vegetables: Any raw or steamed vegetables; vegetables with seeds; corn in any form      Fruits and fruit juices: Prunes, prune juice, raisins and other dried fruits, berries and other fruits with seeds, canned pineapple juices with pulp such as orange, grapefruit, pineapple or tomato juice     Milk products: Any yogurt with nuts, seeds or berries      Proteins: Tough, fibrous meats with gristle; cooked dried beans, peas or lentils; crunchy peanut butter     Fats and condiments: Pickles, olives, relish, horseradish; jam, marmalade, preserves      Snacks, sweets and drinks: Popcorn, nuts, seeds, granola, coconut, candies made with nuts or seeds; all desserts that contain nuts, seeds, raisins and other dried fruits, coconut, whole grains or bran.       1 day before procedure       Start a clear liquid diet (see examples below). Do not eat any solid food.      Drink at least eight to ten 8-ounce glasses of water throughout the day. ? ? ? ? ? ? ? ?    Stop taking NSAIDs pain relievers, such as Advil, Ibuprofen, Motrin, etc. You may take Tylenol.      CLEAR LIQUID DIET:  You  can have: Do not have:    Water, tea, coffee (no milk or cream)   Soda pop, Gatorade (not red or purple)   Coconut water   Jell-O, Popsicles (no milk or fruit pieces - not red or purple)   Fat-free soup broth or bouillon   Plain hard candy, such as clear life savers (not red or purple)   Clear juices and fruit-flavored drinks, such as apple juice, white grape juice, Hi-C, and Nile-Aid (not red or purple)  Milk or milk products such as ice cream, malts or shakes, or coffee creamer   Red or purple drinks of any kind such as cranberry juice, grape juice or Nile-Aid. Avoid red or purple Jell-O, Popsicles, sorbet, sherbet and candy   Juices with pulp such as orange, grapefruit, pineapple or tomato juice   Cream soups of any kind   Alcohol and beer   Protein drinks or protein powder     Step 1     At 4 PM, take 2 Dulcolax (Bisacodyl) tablets.   At 5 PM, mix the entire bottle of Miralax with 64 ounces of Gatorade in a pitcher and stir to dissolve the powder. Start drinking one 8-ounce glass of the Miralax and Gatorade mixture every 15 minutes until the pitcher is HALF empty (about 4 glasses).  Drink each glass quickly. Store the rest in the refrigerator.   Continue to drink clear liquids.    Step 2     At 10 PM, take 2 Dulcolax (Bisacodyl) tablets  At 10 PM start drinking the remainder of the Miralax and Gatorade mixture. Drink one 8-ounce glass of Miralax and Gatorade mixture every 15 minutes until the pitcher is empty (about 4 glasses). Drink each glass quickly.     Step 3     If you arrive for your procedure BEFORE 11 AM:  6 hours prior to your scheduled arrival to the endoscopy unit, drink 10 ounces of clear Magnesium Citrate.    If you arrive for your procedure AFTER 11 AM:  At 6 AM on the day of the exam drink 10 ounces of clear Magnesium Citrate.       Reminders While Drinking Laxatives:     After you start drinking the solution, stay near a toilet. You may have watery stools (diarrhea), mild cramping, bloating, and  nausea. You may want to use Vaseline on the skin around your anus after each bowel movement or use wet wipes to prevent irritation. Bowel movements will be liquid and dark in color at first and then should turn clear yellow in color.      Some find it easier to drink the Miralax and Gatorade mixture when it is chilled. Do not add ice as this will dilute the laxative. Drinking from a straw can be helpful to drink the liquid faster.     If you have nausea or vomiting during drinking the solution, rinse your mouth with water and take a 15-30 minute break and then continue drinking solution.       Day of procedure     2 hours before your arrival time stop drinking all liquids, including water.   Do not smoke or swallow anything, including water or gum for at least 2 hours before your arrival time. This is a safety issue. Your procedure could be cancelled if you do not follow directions.  No chewing tobacco 6 hours prior to procedure arrival time.     You may take your necessary morning medications with sips of water (4 ounces).   Do not take diabetes medicine by mouth until after your exam.  If you have asthma, bring your inhalers.  Please perform your nebulizer treatments and airway clearance therapy in the morning prior to the procedure (if applicable).    Arrive with a responsible adult who can drive you home and stay with you for up to 24 hours. The medications used during the procedure will make you sleepy, so you won't be able to drive yourself home.   You cannot use public transportation, ride-share services, or non-medical taxi services without a responsible caregiver. Medical transport services are okay, but a caregiver must be there to receive you at your destination.  Please check in with your  when you arrive. Drivers should stay on campus.    Expect to be at the procedure center for about 1.5-2.5 hours.    Do not wear jewelry (i.e. earrings, rings, necklaces, watches, etc.). Leave your purse,  billfold, credit cards, and other valuables at home.      Bring insurance card and ID.       Answers to Commonly Asked Questions     How soon can I eat after the procedure?  You may resume your normal diet when you feel ready, unless advised otherwise by the doctor performing your procedure. We recommend starting with a light meal.   Do not drink alcohol for 24 hours after your procedure.  You may resume normal activities (work, exercise, etc.) after 24 hours.    How might I feel after the procedure?  It is normal to feel bloated and gassy after your procedure. Walking will help move the air through your colon. You can take non-aspirin pain relievers that contain acetaminophen (Tylenol).  If you are having sedation, we require a responsible adult to take you home for your safety. The sedation medicines used to relax you during the procedure can impair your judgement and reaction time, and make you forgetful and possibly a little unsteady.  Do not drive, make any important decisions, or sign any legal documents for 24 hours after your procedure.    When will I get my test results?  You should have your procedure results and any lab results (if applicable) by letter, Foremost[employer] message, or phone call within 2 weeks. If you have any questions, please call the doctor that referred you for the procedure.    How do I know if my colon is cleaned out?   After completing the bowel prep, your bowel movements should be all liquid and yellow. Your bowel movements will look similar to urine in the toilet. If there are pieces of stool (poop) in the toilet, or if you can't see to the bottom of the toilet, please call our office for advice. Call [phone] and ask to speak with a nurse.    Why is the Miralax bowel prep taken in several steps?   The stool is flushed out by a large wave of fluid going through the colon. Just sipping a large volume of the solution will not achieve the desired result. Studies have shown that two smaller  Temples.../Clavicles.../Shoulders... waves (or more in some cases) are better than one large one.      Why do I need to drink the magnesium citrate so close to the procedure arrival time?   The intestine continues to produce mucus and waste. Longer intervals between the prep and the exam can lead to less than desired results. However, the stomach must be empty at the time of the exam in order to allow safe sedation. Therefore, there should be nothing by mouth 2 hours before the exam is started.    What if I need to cancel or reschedule my procedure?  Contact our endoscopy scheduling team at [phone], option 2. Monday through Friday, 7:00am-5:00pm.

## 2024-07-11 ENCOUNTER — TELEPHONE (OUTPATIENT)
Dept: GASTROENTEROLOGY | Facility: CLINIC | Age: 19
End: 2024-07-11
Payer: COMMERCIAL

## 2024-07-11 NOTE — TELEPHONE ENCOUNTER
Caller: No Call Made    Reason for Reschedule/Cancellation   (please be detailed, any staff messages or encounters to note?): PT stated to RN that he would like to cancel and declined rescheduling      Prior to reschedule please review:  Ordering Provider: Shahzad rOr MD  Sedation Determined: Moderate  Does patient have any ASC Exclusions, please identify?: No      Notes on Cancelled Procedure:  Procedure: Lower Endoscopy [Colonoscopy]   Date: 07/19/2024  Location: [facility]; Formerly named [facility] [address] [place] [address]   Surgeon: NITZA      Rescheduled: No,         Did you cancel or rescheduled an EUS procedure? No.

## 2024-07-11 NOTE — TELEPHONE ENCOUNTER
Called patient in attempts to complete pre assessment for upcoming Colonoscopy  scheduled on 7/19/24.     Patient requested to cancel procedure at this time. Declined to reschedule stated to just cancel.     Writer will send to endoscopy scheduling team to cancel per request.     Elli Smith RN  Endoscopy Procedure Pre Assessment   [phone] option 4

## 2024-07-11 NOTE — TELEPHONE ENCOUNTER
Pre visit planning completed.      Procedure details:    Patient scheduled for Colonoscopy  on 7/19/24.     Arrival time: 1115. Procedure time 1200    Facility location: [facility]; [address] [place] [address] [place] [address]. Check in location: [facility].     Sedation type: Conscious sedation     Pre op exam needed? N/A    Indication for procedure: BRBPR (bright red blood per rectum)       Chart review:     Electronic implanted devices? No    Recent diagnosis of diverticulitis within the last 6 weeks? No    Diabetic? No      Medication review:    Anticoagulants? No    NSAIDS? No    Other medication HOLDING recommendations:  N/A      Prep for procedure:     Bowel prep recommendation: Standard Miralax  Due to: standard bowel prep.    Prep instructions sent via letter -sent 6/21/24        Elli Smith RN  Endoscopy Procedure Pre Assessment RN  [phone] option 4

## 2024-08-21 ENCOUNTER — TELEPHONE (OUTPATIENT)
Dept: GASTROENTEROLOGY | Facility: CLINIC | Age: 19
End: 2024-08-21
Payer: COMMERCIAL

## 2024-08-21 NOTE — TELEPHONE ENCOUNTER
"Endoscopy Scheduling Screen    Have you had a positive Covid test in the last 14 days?  No    What is your communication preference for Instructions and/or Bowel Prep?   MyChart    What insurance is in the chart?  Other:  [facility]     Ordering/Referring Provider: JENNIFER WILLAMS    (If ordering provider performs procedure, schedule with ordering provider unless otherwise instructed. )    BMI: Estimated body mass index is 32.94 kg/m  as calculated from the following:    Height as of 6/10/24: 1.702 m (5' 7\").    Weight as of 6/10/24: 95.4 kg (210 lb 5.1 oz).     Sedation Ordered  moderate sedation.   If patient BMI > 50 do not schedule in ASC.    If patient BMI > 45 do not schedule at [facility].    Are you taking methadone or Suboxone?  No    Have you had difficulties, pain, or discomfort during past endoscopy procedures?  No    Are you taking any prescription medications for pain 3 or more times per week?   NO, No RN review required.    Do you have a history of malignant hyperthermia?  No    (Females) Are you currently pregnant?   No     Have you been diagnosed or told you have pulmonary hypertension?   No    Do you have an LVAD?  No    Have you been told you have moderate to severe sleep apnea?  No    Have you been told you have COPD, asthma, or any other lung disease?  No    Do you have any heart conditions?  No     Have you ever had or are you waiting for an organ transplant?  No. Continue scheduling, no site restrictions.    Have you had a stroke or transient ischemic attack (TIA aka \"mini stroke\" in the last 6 months?   No    Have you been diagnosed with or been told you have cirrhosis of the liver?   No    Are you currently on dialysis?   No    Do you need assistance transferring?   No    BMI: Estimated body mass index is 32.94 kg/m  as calculated from the following:    Height as of 6/10/24: 1.702 m (5' 7\").    Weight as of 6/10/24: 95.4 kg (210 lb 5.1 oz).     Is patients BMI > 40 and scheduling location " [facility]?  No    Do you take an injectable medication for weight loss or diabetes (excluding insulin)?  No    Do you take the medication Naltrexone?  No    Do you take blood thinners?  No       Prep   Are you currently on dialysis or do you have chronic kidney disease?  No    Do you have a diagnosis of diabetes?  No    Do you have a diagnosis of cystic fibrosis (CF)?  No    On a regular basis do you go 3 -5 days between bowel movements?  No    BMI > 40?  No    Preferred Pharmacy:      [id number] - [place], [place] - [address]  [address]  [facility] [address]  Phone: [phone] Fax: [phone]      Final Scheduling Details     Procedure scheduled  Colonoscopy    Surgeon:  Onel      Date of procedure:   10/08/2024     Pre-OP / PAC:   No - Not required for this site.    Location  [facility] - Per order.    Sedation   Moderate Sedation - Per order.      Patient Reminders:   You will receive a call from a Nurse to review instructions and health history.  This assessment must be completed prior to your procedure.  Failure to complete the Nurse assessment may result in the procedure being cancelled.      On the day of your procedure, please designate an adult(s) who can drive you home stay with you for the next 24 hours. The medicines used in the exam will make you sleepy. You will not be able to drive.      You cannot take public transportation, ride share services, or non-medical taxi service without a responsible caregiver.  Medical transport services are allowed with the requirement that a responsible caregiver will receive you at your destination.  We require that drivers and caregivers are confirmed prior to your procedure.

## 2024-09-09 ENCOUNTER — TELEPHONE (OUTPATIENT)
Dept: GASTROENTEROLOGY | Facility: CLINIC | Age: 19
End: 2024-09-09
Payer: COMMERCIAL

## 2024-09-09 NOTE — TELEPHONE ENCOUNTER
Caller: Zack Mercado      Reason for Reschedule/Cancellation   (please be detailed, any staff messages or encounters to note?): scheduling conflict      Prior to reschedule please review:  Ordering Provider:     JENNIFER WILLAMS     Sedation Determined: MODERATE  Does patient have any ASC Exclusions, please identify?: N      Notes on Cancelled Procedure:  Procedure: Lower Endoscopy [Colonoscopy]   Date: 10/09/2024  Location: [facility]; [address] [place] [address]  Surgeon: HILARIO      Rescheduled: Yes,   Procedure: Lower Endoscopy [Colonoscopy]    Date: 10/16/2024   Location: [facility]; [address] [place] [address]   Surgeon: CORRIE   Sedation Level Scheduled  MODERATE ,  Reason for Sedation Level PER ORDER   Instructions updated and sent: VIA LETTER     Does patient need PAC or Pre -Op Rescheduled? : NO       Did you cancel or rescheduled an EUS procedure? No.

## 2024-09-25 NOTE — TELEPHONE ENCOUNTER
Standard Miralax Bowel Prep recommended due to standard bowel prep. Instructions were sent via letter.

## 2024-10-03 NOTE — TELEPHONE ENCOUNTER
Rescheduled Procedure  Pre visit planning completed.      Procedure details:    Patient scheduled for Colonoscopy on 10/16/24.     Arrival time: 1015. Procedure time 1100    Facility location: [facility]; [place] [id number]. Check in location: Main entrance, door #1 on the North side of the building under roundabout awning. DO NOT GO TO SURGERY/ED ENTRANCE.     Sedation type: Conscious sedation     Pre op exam needed? No.    Indication for procedure: BRBPR      Chart review:     Electronic implanted devices? No    Recent diagnosis of diverticulitis within the last 6 weeks? No      Medication review:    Diabetic? No    Anticoagulants? No    Weight loss medication/injectable? No GLP-1 medication per patient's medication list.  RN will verify with pre-assessment call.    Other medication HOLDING recommendations:  N/A      Prep for procedure:     Bowel prep recommendation: Standard Miralax  Due to: standard bowel prep.    Prep instructions sent via letter sent by CRC team 9/25/24.        Melonie Gambino RN  Endoscopy Procedure Pre Assessment RN  [phone] option 2

## 2024-10-03 NOTE — TELEPHONE ENCOUNTER
Attempted to contact patient in order to complete pre assessment questions. Pre visit planning completed below.     No answer. Left message to return call to [phone] option 2.         Melonie Gambino RN  Endoscopy Procedure Pre Assessment RN

## 2024-10-03 NOTE — TELEPHONE ENCOUNTER
Pre assessment completed for upcoming procedure.   (Please see previous telephone encounter notes for complete details)    Patient  returned call.       Procedure details:    Arrival time and facility location reviewed.    Pre op exam needed? No.    Designated  policy reviewed. Instructed to have someone stay 6  hours post procedure.     CS was explained to pt, and pt was told that he may have things that he remembers from the exam as he may not be completed asleep.  Pt stated he understood.     Medication review:    Medications reviewed. Please see supporting documentation below. Holding recommendations discussed (if applicable).       Prep for procedure:     Procedure prep instructions reviewed.        Any additional information needed:  N/A      Patient  verbalized understanding and had no questions or concerns at this time.      Nanette Bartlett RN  Endoscopy Procedure Pre Assessment   [phone] option 2

## 2024-10-16 ENCOUNTER — HOSPITAL ENCOUNTER (OUTPATIENT)
Facility: CLINIC | Age: 19
Discharge: HOME | End: 2024-10-16
Attending: COLON & RECTAL SURGERY | Admitting: COLON & RECTAL SURGERY
Payer: COMMERCIAL

## 2024-10-16 VITALS
WEIGHT: 190 LBS | SYSTOLIC BLOOD PRESSURE: 114 MMHG | HEIGHT: 67 IN | RESPIRATION RATE: 16 BRPM | OXYGEN SATURATION: 98 % | TEMPERATURE: 97.7 F | DIASTOLIC BLOOD PRESSURE: 67 MMHG | HEART RATE: 75 BPM | BODY MASS INDEX: 29.82 KG/M2

## 2024-10-16 LAB — COLONOSCOPY: NORMAL

## 2024-10-16 PROCEDURE — 45378 DIAGNOSTIC COLONOSCOPY: CPT | Performed by: COLON & RECTAL SURGERY

## 2024-10-16 PROCEDURE — 250N000011 HC RX IP 250 OP 636: Performed by: COLON & RECTAL SURGERY

## 2024-10-16 PROCEDURE — G0500 MOD SEDAT ENDO SERVICE >5YRS: HCPCS | Performed by: COLON & RECTAL SURGERY

## 2024-10-16 RX ORDER — ONDANSETRON 2 MG/ML
4 INJECTION INTRAMUSCULAR; INTRAVENOUS EVERY 6 HOURS PRN
Status: DISCONTINUED | OUTPATIENT
Start: 2024-10-16 | End: 2024-10-16 | Stop reason: HOSPADM

## 2024-10-16 RX ORDER — NALOXONE HYDROCHLORIDE 0.4 MG/ML
0.4 INJECTION, SOLUTION INTRAMUSCULAR; INTRAVENOUS; SUBCUTANEOUS
Status: DISCONTINUED | OUTPATIENT
Start: 2024-10-16 | End: 2024-10-16 | Stop reason: HOSPADM

## 2024-10-16 RX ORDER — FENTANYL CITRATE 50 UG/ML
50-100 INJECTION, SOLUTION INTRAMUSCULAR; INTRAVENOUS EVERY 5 MIN PRN
Status: DISCONTINUED | OUTPATIENT
Start: 2024-10-16 | End: 2024-10-16 | Stop reason: HOSPADM

## 2024-10-16 RX ORDER — PROCHLORPERAZINE MALEATE 10 MG
10 TABLET ORAL EVERY 6 HOURS PRN
Status: DISCONTINUED | OUTPATIENT
Start: 2024-10-16 | End: 2024-10-16 | Stop reason: HOSPADM

## 2024-10-16 RX ORDER — DIPHENHYDRAMINE HYDROCHLORIDE 50 MG/ML
25-50 INJECTION INTRAMUSCULAR; INTRAVENOUS
Status: DISCONTINUED | OUTPATIENT
Start: 2024-10-16 | End: 2024-10-16 | Stop reason: HOSPADM

## 2024-10-16 RX ORDER — FLUMAZENIL 0.1 MG/ML
0.2 INJECTION, SOLUTION INTRAVENOUS
Status: DISCONTINUED | OUTPATIENT
Start: 2024-10-16 | End: 2024-10-16 | Stop reason: HOSPADM

## 2024-10-16 RX ORDER — ONDANSETRON 4 MG/1
4 TABLET, ORALLY DISINTEGRATING ORAL EVERY 6 HOURS PRN
Status: DISCONTINUED | OUTPATIENT
Start: 2024-10-16 | End: 2024-10-16 | Stop reason: HOSPADM

## 2024-10-16 RX ORDER — SIMETHICONE 40MG/0.6ML
133 SUSPENSION, DROPS(FINAL DOSAGE FORM)(ML) ORAL
Status: DISCONTINUED | OUTPATIENT
Start: 2024-10-16 | End: 2024-10-16 | Stop reason: HOSPADM

## 2024-10-16 RX ORDER — ATROPINE SULFATE 0.1 MG/ML
1 INJECTION INTRAVENOUS
Status: DISCONTINUED | OUTPATIENT
Start: 2024-10-16 | End: 2024-10-16 | Stop reason: HOSPADM

## 2024-10-16 RX ORDER — NALOXONE HYDROCHLORIDE 0.4 MG/ML
0.2 INJECTION, SOLUTION INTRAMUSCULAR; INTRAVENOUS; SUBCUTANEOUS
Status: DISCONTINUED | OUTPATIENT
Start: 2024-10-16 | End: 2024-10-16 | Stop reason: HOSPADM

## 2024-10-16 RX ORDER — EPINEPHRINE 1 MG/ML
0.1 INJECTION, SOLUTION, CONCENTRATE INTRAVENOUS
Status: DISCONTINUED | OUTPATIENT
Start: 2024-10-16 | End: 2024-10-16 | Stop reason: HOSPADM

## 2024-10-16 RX ORDER — LIDOCAINE 40 MG/G
CREAM TOPICAL
Status: DISCONTINUED | OUTPATIENT
Start: 2024-10-16 | End: 2024-10-16 | Stop reason: HOSPADM

## 2024-10-16 RX ORDER — ONDANSETRON 2 MG/ML
4 INJECTION INTRAMUSCULAR; INTRAVENOUS
Status: DISCONTINUED | OUTPATIENT
Start: 2024-10-16 | End: 2024-10-16 | Stop reason: HOSPADM

## 2024-10-16 RX ADMIN — FENTANYL CITRATE 100 MCG: 50 INJECTION, SOLUTION INTRAMUSCULAR; INTRAVENOUS at 11:21

## 2024-10-16 RX ADMIN — MIDAZOLAM 2 MG: 1 INJECTION INTRAMUSCULAR; INTRAVENOUS at 11:21

## 2024-10-16 ASSESSMENT — ACTIVITIES OF DAILY LIVING (ADL): ADLS_ACUITY_SCORE: 35

## 2024-10-16 NOTE — H&P
Pre-Endoscopy History and Physical     Zack Mercado MRN# [id number]   YOB: 2005 Age: 19 year old     Date of Procedure: 10/16/2024  Primary care provider: Rudy Bowman  Type of Endoscopy: colonoscopy  Reason for Procedure: diarrhea, rectal bleeding  Type of Anesthesia Anticipated: Moderate Sedation    HPI:    Zack is a 19 year old male who will be undergoing the above procedure.      A history and physical has been performed. The patient's medications and allergies have been reviewed. The risks and benefits of the procedure and the sedation options and risks were discussed with the patient.  All questions were answered and informed consent was obtained.      He denies a personal or family history of anesthesia complications or bleeding disorders.     Allergies   Allergen Reactions    Dust Mite Extract         Prior to Admission Medications   Prescriptions Last Dose Informant Patient Reported? Taking?   acetaminophen 500 MG CAPS Past Month  No Yes   Sig: Take 2 capsules by mouth every 8 hours as needed (For aches, pain, fever) Do not take more than 4000mg in 24 hours.   albuterol (PROAIR HFA/PROVENTIL HFA/VENTOLIN HFA) 108 (90 Base) MCG/ACT inhaler More than a month  No Yes   Sig: Inhale 2 puffs into the lungs every 6 hours as needed for shortness of breath, wheezing or cough   mupirocin (BACTROBAN) 2 % external ointment More than a month  No Yes   Sig: Apply to affected areas of skin TID PRN for 10 days.      Facility-Administered Medications: None       Patient Active Problem List   Diagnosis    Warts    Verruca vulgaris    Scapular dyskinesis    Left shoulder pain        Past Medical History:   Diagnosis Date    Asthma         Past Surgical History:   Procedure Laterality Date    APPENDECTOMY         Social History     Tobacco Use    Smoking status: Never    Smokeless tobacco: Never   Substance Use Topics    Alcohol use: Never       History reviewed. No pertinent family  "history.    REVIEW OF SYSTEMS:     5 point ROS negative except as noted above in HPI, including Gen., Resp., CV, GI &  system review.      PHYSICAL EXAM:   Ht 1.702 m (5' 7\")   Wt 86.2 kg (190 lb)   BMI 29.76 kg/m   Estimated body mass index is 29.76 kg/m  as calculated from the following:    Height as of this encounter: 1.702 m (5' 7\").    Weight as of this encounter: 86.2 kg (190 lb).   GENERAL APPEARANCE: healthy and alert  MENTAL STATUS: alert  AIRWAY EXAM: Mallampatti Class II (visualization of the soft palate, fauces, and uvula)  RESP: lungs clear to auscultation - no rales, rhonchi or wheezes  CV: regular rates and rhythm      DIAGNOSTICS:    Not indicated      IMPRESSION   ASA Class 2 - Mild systemic disease        PLAN:       Plan for colonoscopy. We discussed the risks, benefits and alternatives and the patient wished to proceed.    The above has been forwarded to the consulting provider.      Signed Electronically by: Lotus Barba MD  October 16, 2024    "

## (undated) RX ORDER — FENTANYL CITRATE 50 UG/ML
INJECTION, SOLUTION INTRAMUSCULAR; INTRAVENOUS
Status: DISPENSED
Start: 2024-10-16